# Patient Record
Sex: FEMALE | URBAN - NONMETROPOLITAN AREA
[De-identification: names, ages, dates, MRNs, and addresses within clinical notes are randomized per-mention and may not be internally consistent; named-entity substitution may affect disease eponyms.]

---

## 2021-04-15 ENCOUNTER — OFFICE VISIT (OUTPATIENT)
Dept: ENT CLINIC | Age: 61
End: 2021-04-15
Payer: COMMERCIAL

## 2021-04-15 VITALS
HEIGHT: 63 IN | DIASTOLIC BLOOD PRESSURE: 66 MMHG | SYSTOLIC BLOOD PRESSURE: 138 MMHG | BODY MASS INDEX: 31.54 KG/M2 | WEIGHT: 178 LBS

## 2021-04-15 DIAGNOSIS — H81.01: Primary | ICD-10-CM

## 2021-04-15 PROCEDURE — 99203 OFFICE O/P NEW LOW 30 MIN: CPT | Performed by: PHYSICIAN ASSISTANT

## 2021-04-15 RX ORDER — RALOXIFENE HYDROCHLORIDE 60 MG/1
TABLET, FILM COATED ORAL
COMMUNITY
Start: 2021-04-04

## 2021-04-15 RX ORDER — LEVOCETIRIZINE DIHYDROCHLORIDE 5 MG/1
5 TABLET, FILM COATED ORAL NIGHTLY
COMMUNITY

## 2021-04-15 RX ORDER — TRIAMTERENE AND HYDROCHLOROTHIAZIDE 37.5; 25 MG/1; MG/1
CAPSULE ORAL PRN
COMMUNITY
Start: 2021-04-04 | End: 2022-04-04

## 2021-04-15 RX ORDER — ENALAPRIL MALEATE 5 MG/1
TABLET ORAL
COMMUNITY

## 2021-04-15 RX ORDER — INSULIN HUMAN 100 [IU]/ML
INJECTION, SUSPENSION SUBCUTANEOUS
COMMUNITY
Start: 2021-04-10 | End: 2021-04-15

## 2021-04-15 RX ORDER — LANCETS 33 GAUGE
EACH MISCELLANEOUS
COMMUNITY

## 2021-04-15 RX ORDER — INSULIN HUMAN 100 [IU]/ML
INJECTION, SUSPENSION SUBCUTANEOUS
COMMUNITY

## 2021-04-15 ASSESSMENT — ENCOUNTER SYMPTOMS
EYE DISCHARGE: 0
VOICE CHANGE: 0
TROUBLE SWALLOWING: 0
SORE THROAT: 0
SINUS PAIN: 0
FACIAL SWELLING: 0
RHINORRHEA: 0
EYE PAIN: 0
SINUS PRESSURE: 0

## 2021-04-15 NOTE — PROGRESS NOTES
[facility] OTOLARYNGOLOGY/ENT  Ms. Hampton Aschoff is a pleasant 57-year-old  female that was referred by Dr. Ant Sanchez in [place] due to chronic issues with Ménière's disease. She was a previous patient of Dr. Rodolfo Garcia in [place]. She reports that she was treated with Dyazide therapy and responded quite nicely. Currently she is having no issues with vertigo. She reports that the episodes are very sporadic and seems to be triggered when she has allergy issues or sinus issues. She reports a family history of Ménière's to include her maternal aunt. She reports that when she has the dizziness, this will last about an hour or 2 then will resolve. She is still able to function with no debilitating issues. 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 She currently wears a hearing aid to the right ear due to the deficit. Allergies: No known allergies      Current Outpatient Medications   Medication Sig Dispense Refill    metFORMIN (GLUCOPHAGE) 1000 MG tablet metformin 1,000 mg tablet      enalapril (VASOTEC) 5 MG tablet enalapril maleate 5 mg tablet      insulin NPH (HUMULIN N KWIKPEN) 100 UNIT/ML injection pen Humulin N NPH U-100 Insulin KwikPen 100 unit/mL (3 mL) subcutaneous      triamterene-hydroCHLOROthiazide (DYAZIDE) 37.5-25 MG per capsule       blood glucose test strips (ASCENSIA AUTODISC VI;ONE TOUCH ULTRA TEST VI) strip OneTouch Verio test strips      Lancets (ONETOUCH DELICA PLUS ICMFLG06J) MISC OneTouch Delica Plus Lancet 33 gauge      Insulin Pen Needle 32G X 6 MM MISC BD Ultra-Fine Karolina Pen Needle 32 gauge x 5/32\"      raloxifene (EVISTA) 60 MG tablet       levocetirizine (XYZAL) 5 MG tablet Take 5 mg by mouth nightly      budesonide (RHINOCORT ALLERGY) 32 MCG/ACT nasal spray 1 spray by Each Nostril route daily       No current facility-administered medications for this visit.         Past Surgical History:   Procedure Laterality Date     SECTION      CHOLECYSTECTOMY         Past Medical History:   Diagnosis Date    Diabetes mellitus ([facility] [address]        History reviewed. No pertinent family history. Social History     Tobacco Use    Smoking status: Never Smoker    Smokeless tobacco: Never Used   Substance Use Topics    Alcohol use: Never     Frequency: Never           REVIEW OF SYSTEMS:  all other systems reviewed and are negative  Review of Systems   Constitutional: Negative for chills and fever. HENT: Positive for tinnitus. Negative for congestion, dental problem, ear discharge, ear pain, facial swelling, hearing loss, nosebleeds, postnasal drip, rhinorrhea, sinus pressure, sinus pain, sneezing, sore throat, trouble swallowing and voice change. Eyes: Negative for pain and discharge. Neurological: Positive for dizziness. Negative for seizures and syncope. Comments:     PHYSICAL EXAM:    /66   Ht 5' 3\" (1.6 m)   Wt 178 lb (80.7 kg)   BMI 31.53 kg/m²   Body mass index is 31.53 kg/m². General Appearance: well developed  and well nourished  Head/ Face: normocephalic and atraumatic  Vocal Quality: good/ normal  Ears: Right Ear: External: external ears normal Otoscopy Ear Canal: canal clear Otoscopy TM: TM's normal and TM's mobile Left Ear: External: external ears normal Otoscopy Ear Canal: canal clear Otoscopy TM: TM's normal and TM's mobile  Hearing: grossly intact  Nose: nares normal and septum midline  Neck: supple and adenopathy none palpable  Thyroid: normal and nodules No   She is noted with no evidence of nystagmus to ocular exam.     Assessment & Plan:    Problem List Items Addressed This Visit     Meniere's disease in remission, right - Primary     Ménière's disease of the right earcurrently in remission and asymptomatic  Plan: I advised the patient to only take the Dyazide when she is having the vertigo symptoms. She currently reports that she has plenty of meclizine for the sporadic vertigo.   I

## 2021-04-15 NOTE — ASSESSMENT & PLAN NOTE
Ménière's disease of the right earcurrently in remission and asymptomatic  Plan: I advised the patient to only take the Dyazide when she is having the vertigo symptoms. She currently reports that she has plenty of meclizine for the sporadic vertigo. I advised her to follow with me in 1 year for annual visit. She is to call if she has a flareup of the Ménière's for reevaluation.

## 2022-04-04 ENCOUNTER — OFFICE VISIT (OUTPATIENT)
Dept: ENT CLINIC | Age: 62
End: 2022-04-04
Payer: COMMERCIAL

## 2022-04-04 VITALS
HEIGHT: 63 IN | SYSTOLIC BLOOD PRESSURE: 160 MMHG | RESPIRATION RATE: 18 BRPM | BODY MASS INDEX: 31.36 KG/M2 | WEIGHT: 177 LBS | DIASTOLIC BLOOD PRESSURE: 98 MMHG

## 2022-04-04 DIAGNOSIS — H81.01: Primary | ICD-10-CM

## 2022-04-04 PROCEDURE — 99213 OFFICE O/P EST LOW 20 MIN: CPT | Performed by: PHYSICIAN ASSISTANT

## 2022-04-04 RX ORDER — ESTRADIOL 0.1 MG/G
CREAM VAGINAL
COMMUNITY
Start: 2022-02-09

## 2022-04-04 RX ORDER — TRIAMTERENE AND HYDROCHLOROTHIAZIDE 37.5; 25 MG/1; MG/1
TABLET ORAL
Qty: 30 TABLET | Refills: 5 | Status: SHIPPED | OUTPATIENT
Start: 2022-04-04

## 2022-04-04 NOTE — PROGRESS NOTES
Mrs. Ila Babinski is a pleasant 65-year-old  female that presents for a 1 year follow-up due to problems with Ménière's disease. She reports that she has only had 1 flareup that was back in November that responded nicely to the diuretics. She was discovered to have an ear infection that was treated with steroids and antibiotics that ultimately fixed her symptoms. She currently reports that she is having some lethargy with the medication and was inquiring about the dosing. Physical examination revealed the patient have a normal TM and canals bilaterally. Pupils demonstrated to be equal round reactive to light accommodation with extraocular muscles intact. No lateral nystagmus is noted. Examination of the nares demonstrated no nasal polyps or any obstruction. Oral exam demonstrated a normal posterior pharynx with tongue noted be midline. Neck exam demonstrated no lymphadenopathy or thyromegaly. Impression: Clinically stable Ménière's disease    Plan: I advised the patient that I will give her refills for her diuretics. I have recommended taking only 1/2 tablet when she has flareup of the vertigo due to her lethargy. The prescription was called into [address] [place] [address] at [place]. At this point she is follow-up with me in 1 year.       Electronically signed by Lo Mullins PA-C on 4/4/22 at 10:34 AM CDT